# Patient Record
Sex: FEMALE
[De-identification: names, ages, dates, MRNs, and addresses within clinical notes are randomized per-mention and may not be internally consistent; named-entity substitution may affect disease eponyms.]

---

## 2018-06-29 ENCOUNTER — HOSPITAL ENCOUNTER (EMERGENCY)
Dept: HOSPITAL 31 - C.ER | Age: 38
Discharge: HOME | End: 2018-06-29
Payer: COMMERCIAL

## 2018-06-29 VITALS
TEMPERATURE: 98.2 F | HEART RATE: 90 BPM | DIASTOLIC BLOOD PRESSURE: 93 MMHG | RESPIRATION RATE: 17 BRPM | SYSTOLIC BLOOD PRESSURE: 123 MMHG | OXYGEN SATURATION: 99 %

## 2018-06-29 VITALS — BODY MASS INDEX: 26.9 KG/M2

## 2018-06-29 DIAGNOSIS — N30.90: Primary | ICD-10-CM

## 2018-06-29 LAB
BILIRUB UR-MCNC: NEGATIVE MG/DL
GLUCOSE UR STRIP-MCNC: NORMAL MG/DL
HCG,QUALITATIVE URINE: NEGATIVE
LEUKOCYTE ESTERASE UR-ACNC: (no result) LEU/UL
PH UR STRIP: 6 [PH] (ref 5–8)
PROT UR STRIP-MCNC: NEGATIVE MG/DL
RBC # UR STRIP: (no result) /UL
SP GR UR STRIP: 1.01 (ref 1–1.03)
SQUAMOUS EPITHIAL: < 1 /HPF (ref 0–5)
UROBILINOGEN UR-MCNC: NORMAL MG/DL (ref 0.2–1)

## 2018-06-29 NOTE — C.PDOC
History Of Present Illness





Patient presents to ED c/o suprapubic pressure, urinary urgency and dysuria 

since yesterday.  She denies fever, abdominal pain, nausea/vomiting, vaginal 

bleeding/discharge, labial pain/swelling, hematuria.


Time Seen by Provider: 06/29/18 11:38


Chief Complaint (Nursing): Female Genitourinary


History Per: Patient


History/Exam Limitations: no limitations


Onset/Duration Of Symptoms: Days (2)


Current Symptoms Are (Timing): Still Present


Severity: Mild


Quality Of Discomfort: Burning, Pressure


Abnormal Vaginal Bleeding: No





Past Medical History


Reviewed: Historical Data, Nursing Documentation, Vital Signs


Vital Signs: 





 Last Vital Signs











Temp  98.2 F   06/29/18 11:34


 


Pulse  90   06/29/18 11:34


 


Resp  17   06/29/18 11:34


 


BP  123/93 H  06/29/18 11:34


 


Pulse Ox  99   06/29/18 11:34














- Medical History


PMH: Arthritis (KNEE PAIN,JOINT PAIN), Gastritis (ESOPHAGITIS)


   Denies: Chronic Kidney Disease


Surgical History: Endoscopy


Family History: States: No Known Family Hx





- Social History


Hx Alcohol Use: No


Hx Substance Use: No





- Immunization History


Hx Tetanus Toxoid Vaccination: No


Hx Influenza Vaccination: No


Hx Pneumococcal Vaccination: No





Review Of Systems


Constitutional: Negative for: Fever, Chills


Respiratory: Negative for: Shortness of Breath


Gastrointestinal: Negative for: Nausea, Vomiting, Abdominal Pain, Diarrhea


Genitourinary: Positive for: Dysuria, Frequency, Pelvic Pain.  Negative for: 

Incontinence, Hematuria, Vaginal Discharge, Vaginal Bleeding


Skin: Negative for: Rash





Physical Exam





- Physical Exam


Appears: Well, Non-toxic, No Acute Distress


Skin: Normal Color, Warm, Dry


Oral Mucosa: Moist


Cardiovascular: Rhythm Regular


Respiratory: Normal Breath Sounds, No Rales, No Rhonchi, No Wheezing


Gastrointestinal/Abdominal: Tenderness (mild suprapubic TTP, (-) McBurney's, (-

) Marquez's)


Back: No CVA Tenderness


Neurological/Psych: Oriented x3





ED Course And Treatment


O2 Sat by Pulse Oximetry: 99 (RA)


Pulse Ox Interpretation: Normal


Progress Note: UA, Upreg ordered and reviewed.  UA shows +1 blood, no nitrates 

or leukesterase.  Patient's symptoms suspcious for acute cystitis - she denies 

vaginal discharge/bleeding/lesions/rash, flank pain, hematuria.  UCx sent.  

Will treat patient with PO Cipro and Pyridium, and instructed her to follow up 

with her PMD/clinic in 1-2 days.  She understands she should return to ED if 

symptoms worsen.





Disposition


Counseled Patient/Family Regarding: Studies Performed, Diagnosis, Need For 

Followup, Rx Given





- Disposition


Referrals: 


[facility] [Outside]


Disposition: HOME/ ROUTINE


Disposition Time: 12:50


Condition: STABLE


Additional Instructions: 


FOLLOW UP WITH YOUR DOCTOR/CLINIC IN 1-2 DAYS





USE MEDICATIONS AS DIRECTED





RETURN TO EMERGENCY ROOM IF SYMPTOMS WORSEN 








SEGUIMIENTO CON VALENCIA MDICO / CLNICA EN 1-2 LOPEZ





USE MEDICAMENTOS SEGN LO INDICADO





REGRESE AL TOMMY DE EMERGENCIA SI LOS SNTOMAS EMPEORAN


Prescriptions: 


Ciprofloxacin [Cipro] 1 tab PO BID #14 tab


Phenazopyridine [Pyridium] 100 mg PO TID #9 tab


Instructions:  Acute Cystitis (DC)


Print Language: Italian





- Clinical Impression


Clinical Impression: 


 Hemorrhagic cystitis

## 2018-07-01 ENCOUNTER — HOSPITAL ENCOUNTER (EMERGENCY)
Dept: HOSPITAL 31 - C.ER | Age: 38
Discharge: HOME | End: 2018-07-01
Payer: COMMERCIAL

## 2018-07-01 VITALS — TEMPERATURE: 98.2 F | DIASTOLIC BLOOD PRESSURE: 84 MMHG | SYSTOLIC BLOOD PRESSURE: 121 MMHG | HEART RATE: 74 BPM

## 2018-07-01 VITALS — BODY MASS INDEX: 26.9 KG/M2

## 2018-07-01 VITALS — RESPIRATION RATE: 18 BRPM

## 2018-07-01 DIAGNOSIS — R30.0: Primary | ICD-10-CM

## 2018-07-01 LAB
BACTERIA #/AREA URNS HPF: (no result) /[HPF]
BILIRUB UR-MCNC: NEGATIVE MG/DL
GLUCOSE UR STRIP-MCNC: NORMAL MG/DL
LEUKOCYTE ESTERASE UR-ACNC: (no result) LEU/UL
PH UR STRIP: 5 [PH] (ref 5–8)
PROT UR STRIP-MCNC: NEGATIVE MG/DL
RBC # UR STRIP: (no result) /UL
SP GR UR STRIP: 1.01 (ref 1–1.03)
SQUAMOUS EPITHIAL: 1 /HPF (ref 0–5)
UROBILINOGEN UR-MCNC: NORMAL MG/DL (ref 0.2–1)

## 2018-07-01 NOTE — C.PDOC
History Of Present Illness





38 y/o female c/o suprapubic pain, dyusuria and frequency for several days. pt 

seen in ED on friday for same and started on Cipro and pyridium. pt started 

feeling better, then had worse pain since last night. denies n/v/d, fever, chill

, back pain, vaginal discharge, vaginal lesions.  pt sts it feels like a 

burning sensation on the inside.


Time Seen by Provider: 07/01/18 13:16


Chief Complaint (Nursing): Female Genitourinary


History Per: Patient


History/Exam Limitations: no limitations (4)


Onset/Duration Of Symptoms: Days (4)


Severity: Moderate


Quality Of Discomfort: Burning


Associated Symptoms: Urinary Symptoms.  denies: Fever, Chills, Nausea, Vomiting

, Back Pain





Past Medical History


Reviewed: Historical Data, Nursing Documentation, Vital Signs


Vital Signs: 


 Last Vital Signs











Temp  98.2 F   07/01/18 15:37


 


Pulse  74   07/01/18 15:37


 


Resp  18   07/01/18 15:37


 


BP  121/84   07/01/18 15:37


 


Pulse Ox  98   07/05/18 16:24














- Medical History


PMH: Arthritis (KNEE PAIN,JOINT PAIN), Gastritis (ESOPHAGITIS)


   Denies: Chronic Kidney Disease


Surgical History: Endoscopy


Family History: States: Unknown Family Hx





- Social History


Hx Alcohol Use: No


Hx Substance Use: No





- Immunization History


Hx Tetanus Toxoid Vaccination: No


Hx Influenza Vaccination: No


Hx Pneumococcal Vaccination: No





Review Of Systems


Constitutional: Negative for: Fever, Chills


Gastrointestinal: Positive for: Abdominal Pain (suprpubic).  Negative for: 

Nausea, Vomiting


Genitourinary: Positive for: Dysuria, Frequency.  Negative for: Vaginal 

Discharge, Vaginal Bleeding, Rash


Skin: Negative for: Rash


Neurological: Negative for: Weakness, Numbness





Physical Exam





- Physical Exam


Appears: Non-toxic, No Acute Distress


Skin: Warm, Dry


Head: Atraumatic, Normacephalic


Oral Mucosa: Moist


Neck: Supple


Cardiovascular: Rhythm Regular, No Murmur


Respiratory: No Decreased Breath Sounds, No Wheezing


Gastrointestinal/Abdominal: Bowel Sounds, Soft, Tenderness (mild suprpapubic 

tenderness), No Distention, No Guarding, No Rebound


Back: No CVA Tenderness


Pelvic: Normal External Exam, Normal Speculum Exam, Normal Bimanual Exam, No 

Vaginal Bleeding, Vaginal Discharge (scant white discharge), No Cervical Motion 

Tenderness, No Adnexal Tenderness, Other (chaperoned by JAY Alvarez)


Neurological/Psych: Oriented x3, Normal Speech, Normal Cognition





ED Course And Treatment


O2 Sat by Pulse Oximetry: 98





Medical Decision Making


Medical Decision Making: 





pt with bladder discomfort for several days, taking cipro and pyridium. repeat 

ua with +1 blood, 3 rbc, no le or nitrate. scant white d/c in vaginal vault.  

possibility of cystitis or interstitial cystitis; will tx for vaginitis with 

metro gel pt has clinic appt 7/9, recommend continue cipro and pyridium, start 

metro gel. pt should f/u gyn and urology.





Disposition


Counseled Patient/Family Regarding: Studies Performed, Diagnosis, Need For 

Followup, Rx Given





- Disposition


Referrals: 


 at [facility] [Outside]


Jak Crump MD [Staff Provider] - 


Disposition: HOME/ ROUTINE


Disposition Time: 15:03


Condition: GOOD


Additional Instructions: 


Continue cipro and pyridium. Tylenol or MOtrin for pain. Follow up with medical 

clinic, gyn and with urology.  Use metro gel as prescribed.  Do not drink any 

alcoholic beverage with this medicine


Prescriptions: 


Metronidazole [Metrogel-Vaginal] 1 applic VG HS #1 gel


Instructions:  Dysuria, Adult (DC)


Forms:  [facility] (English), General Discharge Instructions





- Clinical Impression


Clinical Impression: 


 Dysuria

## 2018-07-05 VITALS — OXYGEN SATURATION: 98 %
